# Patient Record
Sex: FEMALE | Race: WHITE | NOT HISPANIC OR LATINO | URBAN - METROPOLITAN AREA
[De-identification: names, ages, dates, MRNs, and addresses within clinical notes are randomized per-mention and may not be internally consistent; named-entity substitution may affect disease eponyms.]

---

## 2019-02-05 ENCOUNTER — EMERGENCY (EMERGENCY)
Facility: HOSPITAL | Age: 69
LOS: 1 days | Discharge: HOME | End: 2019-02-05
Admitting: EMERGENCY MEDICINE
Payer: MEDICARE

## 2019-02-05 ENCOUNTER — INBOUND DOCUMENT (OUTPATIENT)
Age: 69
End: 2019-02-05

## 2019-02-05 VITALS
TEMPERATURE: 98 F | HEART RATE: 76 BPM | RESPIRATION RATE: 18 BRPM | SYSTOLIC BLOOD PRESSURE: 123 MMHG | DIASTOLIC BLOOD PRESSURE: 87 MMHG | OXYGEN SATURATION: 98 %

## 2019-02-05 PROBLEM — Z00.00 ENCOUNTER FOR PREVENTIVE HEALTH EXAMINATION: Status: ACTIVE | Noted: 2019-02-05

## 2019-02-05 PROCEDURE — 73562 X-RAY EXAM OF KNEE 3: CPT | Mod: 26,LT

## 2019-02-05 PROCEDURE — 73610 X-RAY EXAM OF ANKLE: CPT | Mod: 26,LT

## 2019-02-05 PROCEDURE — 99283 EMERGENCY DEPT VISIT LOW MDM: CPT | Mod: 25

## 2019-02-05 RX ORDER — IBUPROFEN 200 MG
600 TABLET ORAL ONCE
Qty: 0 | Refills: 0 | Status: COMPLETED | OUTPATIENT
Start: 2019-02-05 | End: 2019-02-05

## 2019-02-05 RX ADMIN — Medication 600 MILLIGRAM(S): at 11:57

## 2019-02-05 NOTE — ED PROVIDER NOTE - OBJECTIVE STATEMENT
69 y/o female with no pertinent PMHx presents to the ED with complaints of left knee and ankle pain [place]. Pt states when she slipped on the stairs, she twisted her ankle and knee. She is able to bear weight on left leg. Denies any fall. No head trauma, no deformities, no abrasions.

## 2019-02-05 NOTE — ED PROVIDER NOTE - PHYSICAL EXAMINATION
VITAL SIGNS: I have reviewed nursing notes and confirm.  CONSTITUTIONAL: Well-developed; well-nourished; in no acute distress.  SKIN: Skin is warm and dry, no acute rash.  HEAD: Normocephalic; atraumatic.  EYES: PERRL, EOM intact; conjunctiva and sclera clear.  ENT: No nasal discharge; airway clear.  NECK: Supple; non tender.  CARD: S1, S2 normal; no murmurs, gallops, or rubs. Regular rate and rhythm.  RESP: No wheezes, rales or rhonchi.  ABD: Normal bowel sounds; soft; non-distended; non-tender; no hepatosplenomegaly.  EXT: Left lower ankle; +Ecchymosis and edema to lateral malleolus. Mild tenderness to palpation. No bony instability, no bony point tenderness. FROM of ankle, 5/5 strength with pain. Weight bearing with limp. 2+ distal pulses. Sensation intact. <2 second cap refill.    NEURO: Alert, oriented. Grossly unremarkable.  PSYCH: Cooperative, appropriate.

## 2019-02-05 NOTE — ED ADULT NURSE NOTE - NSIMPLEMENTINTERV_GEN_ALL_ED
Implemented All Universal Safety Interventions:  [employer] to call system. Call bell, personal items and telephone within reach. Instruct patient to call for assistance. Room bathroom lighting operational. Non-slip footwear when patient is off stretcher. Physically safe environment: no spills, clutter or unnecessary equipment. Stretcher in lowest position, wheels locked, appropriate side rails in place.

## 2019-02-05 NOTE — ED PROVIDER NOTE - DIAGNOSTIC INTERPRETATION
Radiology Interpretation performed by ED JOSE Bacon   Left knee and ankle x-ray, 3 views  No fracture, no dislocation (joint spaces grossly normal), no Foreign Body noted, soft tissue normal

## 2019-02-05 NOTE — ED PROVIDER NOTE - NSFOLLOWUPINSTRUCTIONS_ED_ALL_ED_FT
-PLEASE FOLLOW-UP WITH YOUR PRIMARY CARE DOCTOR IN 1-2 DAYS.  BRING ALL PAPERWORK FROM TODAY'S VISIT TO YOUR FOLLOW-UP VISIT.  IF YOU DO NOT HAVE A PRIMARY CARE DOCTOR PLEASE REFER TO THE OFFICE/CLINIC INFORMATION GIVEN ABOVE.  YOU MAY ALSO CALL [phone] AND ASK FOR MS. CLEM VANESSA.  SHE CAN HELP YOU MAKE A FOLLOW-UP APPOINTMENT.  HER HOURS ARE 11AM-7PM MONDAY - FRIDAY.  -TAKE OVER THE COUNTER TYLENOL 650MG BY MOUTH EVERY 4-6 HOURS AS NEEDED FOR PAIN.  DO NOT MIX WITH ALCOHOL OR OTHER PRESCRIPTION MEDICATIONS THAT ALREADY CONTAIN TYLENOL OR ACETAMINOPHEN.   -TAKE OVER THE COUNTER IBUPROFEN 400-600MG BY MOUTH EVERY 8 HOURS AS NEEDED FOR PAIN.  BE SURE TO TAKE WITH FOOD OR MILK AS THIS MEDICATION CAN CAUSE STOMACH IRRITATION.  -PLEASE RETURN TO THE ER IMMEDIATELY OR CALL 911 FOR ANY HIGH FEVER, TROUBLE BREATHING, VOMITING, SEVERE PAIN, OR ANY OTHER CONCERNS.     Sprain    A sprain is a stretch or tear in one of the tough, fiber-like tissues (ligaments) in your body. This is caused by an injury to the area such as a twisting mechanism. Symptoms include pain, swelling, or bruising. Rest that area over the next several days and slowly resume activity when tolerated. Ice can help with swelling and pain.     SEEK IMMEDIATE MEDICAL CARE IF YOU HAVE ANY OF THE FOLLOWING SYMPTOMS: worsening pain, inability to move that body part, numbness or tingling.

## 2019-02-05 NOTE — ED PROVIDER NOTE - PROGRESS NOTE DETAILS
no fractures seen on xray. Will ace bandage and give cane for +ankle sprain. Will give ortho f/u A medical screening exam was performed and no emergency medical condition was identified. I have discussed the discharge plan with the patient. The patient agrees with the plan, as discussed. The patient understands that if the symptoms worsen or if prescribed medications do not have the desired/planned effect that the patient may return to the Emergency Department at any time for further evaluation and treatment.

## 2019-02-05 NOTE — ED PROVIDER NOTE - NS ED ROS FT
Other than symptoms associated with present events the following is reported:  General:  No fever, no chills, no weight loss.  HEENT:  No sore throat.  Respiratory: No cough, no dyspnea, no wheeze.  Cardiovascular:  No chest pain, no palpitations, no orthopnea.  GI: No abdominal pain, no nausea/vomiting, no diarrhea.  : No dysuria, no frequency, no urgency.  Musculoskeletal:  No myalgia, deformities.   Endocrine:  No generalized weakness, no polyuria.  Neurological:  No headache, no focal weakness, no head trauma.   Psychiatric: No emotional stress, no depression.  Derm:  No rash, no abrasions.   Heme:  No bruising, no bleeding.

## 2019-02-09 DIAGNOSIS — M25.562 PAIN IN LEFT KNEE: ICD-10-CM
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
## 2019-02-09 DIAGNOSIS — S93.402A SPRAIN OF UNSPECIFIED LIGAMENT OF LEFT ANKLE, INITIAL ENCOUNTER: ICD-10-CM
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
## 2019-02-09 DIAGNOSIS — S90.02XA CONTUSION OF LEFT ANKLE, INITIAL ENCOUNTER: ICD-10-CM
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
## 2021-03-26 ENCOUNTER — APPOINTMENT (OUTPATIENT)
Dept: RHEUMATOLOGY | Facility: CLINIC | Age: 71
End: 2021-03-26

## 2021-06-22 ENCOUNTER — APPOINTMENT (OUTPATIENT)
Dept: RHEUMATOLOGY | Facility: CLINIC | Age: 71
End: 2021-06-22
Payer: MEDICARE

## 2021-06-22 VITALS
BODY MASS INDEX: 23.46 KG/M2 | DIASTOLIC BLOOD PRESSURE: 53 MMHG | HEIGHT: 66 IN | WEIGHT: 146 LBS | SYSTOLIC BLOOD PRESSURE: 93 MMHG | HEART RATE: 71 BPM | OXYGEN SATURATION: 96 % | TEMPERATURE: 97.6 F

## 2021-06-22 DIAGNOSIS — M25.519 PAIN IN UNSPECIFIED SHOULDER: ICD-10-CM

## 2021-06-22 PROCEDURE — 99203 OFFICE O/P NEW LOW 30 MIN: CPT

## 2021-06-28 ENCOUNTER — TRANSCRIPTION ENCOUNTER (OUTPATIENT)
Age: 71
End: 2021-06-28

## 2021-06-29 ENCOUNTER — OUTPATIENT (OUTPATIENT)
Dept: OUTPATIENT SERVICES | Facility: HOSPITAL | Age: 71
LOS: 1 days | Discharge: HOME | End: 2021-06-29

## 2021-06-29 PROBLEM — M25.519 SHOULDER PAIN: Status: ACTIVE | Noted: 2021-06-22

## 2021-06-29 NOTE — HISTORY OF PRESENT ILLNESS
[FreeTextEntry1] : 70 year old woman with history of osteoporosis on recent bone density\par Patient referred by Dr. Dyer\par \par Patient reports history of osteoporosis by DEXA\par Previous osteopenia \par \par No history of fracture\par No family history of fracture\par Mother may have had osteoporosis\par \par Meds \par simvastatin x 2 qweeks\par Tries to take vitamin D and B complex\par \par Taking vitamin D for years\par Menopause at 49 years of age\par No history of HRT use\par When very young, took OCP for menses\par \par Exercise all the time, mile hike , running up 84 steps\par Elliptical three times per week in [place] 30 minutes\par Pilates 2-3 times per week\par In [place] completed by video once per week\par Nearly a vegan, milk in tea, oatmilk in cereal, no cheese at present, trying to cut down on sugar\par No smoking, quit completely one year ago\par 2 drinks of wine per day\par \par No prednisone in past\par Will start weight training\par right shoulder with arthritis, will start PT on Wednesday\par MRI lumbar spine and hip\par Rarely takes medication for pain\par \par No history of thyroid problems\par May have had a nephrolithiasis by ultrasound

## 2021-06-29 NOTE — DATA REVIEWED
[FreeTextEntry1] : MRI hip  6/16/2021\par Reviewed:\par Right hip moderate trochanteric bursitis\par mild glut min tendinosis\par Tear of anterior superior and direct superior labrum with intralabral cyst formation\par Moderate joint arthrosis, mild capsular thickening\par Mild to moderate bilateral adductor longus tendinosis\par MRI lumbar spine:\par mild to moderate lumbar levoscoliosis and thoracolumbar dextroscoliosis\par Right renal peripelvic cyst\par multilevel disc and facet joint disease without change except L2-L3 where there is mild worsening. \par \par June 23, 2021\par Labs:\par CBC normal \par cholesterol 170\par \par DEXA:\par 2/19/2021\par L spine: L4 -1.6\par Left neck -2.1, wards left -3.1, total left hip -1.6\par Right neck -2.3, wards right -3.1, total right hip -2.1

## 2021-06-29 NOTE — ASSESSMENT
[FreeTextEntry1] : 70 year old woman referred by Dr. Dyer for evaluation of osteoporosis.  Patient with previous bone density with osteopenia, now recent bone density with osteoporosis as per patient, although results not available to me at this time.  Discussed treatment options with patient, including both denosumab vs oral bisphosphonate.  Will review lab results in addition to bone density results, will try ot obtain from PMD office.  Will further discuss with patient following review of records.  Weight bearing exercises discussed in addition to increasing calcium enriched foods and continuing vitamin D supplementation.  Patient will start PT for the hip and lumbar spine pain as well, has followup with sports medicine pending.

## 2021-06-29 NOTE — PHYSICAL EXAM
[General Appearance - Alert] : alert [General Appearance - In No Acute Distress] : in no acute distress [General Appearance - Well-Appearing] : healthy appearing [Respiration, Rhythm And Depth] : normal respiratory rhythm and effort [Exaggerated Use Of Accessory Muscles For Inspiration] : no accessory muscle use [Edema] : there was no peripheral edema [No Spinal Tenderness] : no spinal tenderness [] : no rash [Oriented To Time, Place, And Person] : oriented to person, place, and time [Impaired Insight] : insight and judgment were intact [Affect] : the affect was normal

## 2021-12-09 ENCOUNTER — APPOINTMENT (OUTPATIENT)
Dept: RHEUMATOLOGY | Facility: CLINIC | Age: 71
End: 2021-12-09
Payer: MEDICARE

## 2021-12-09 VITALS
HEIGHT: 66 IN | DIASTOLIC BLOOD PRESSURE: 77 MMHG | WEIGHT: 147 LBS | TEMPERATURE: 97.4 F | OXYGEN SATURATION: 96 % | BODY MASS INDEX: 23.63 KG/M2 | SYSTOLIC BLOOD PRESSURE: 127 MMHG | HEART RATE: 70 BPM

## 2021-12-09 VITALS
WEIGHT: 147 LBS | HEIGHT: 66 IN | DIASTOLIC BLOOD PRESSURE: 77 MMHG | TEMPERATURE: 97.4 F | OXYGEN SATURATION: 96 % | BODY MASS INDEX: 23.63 KG/M2 | HEART RATE: 70 BPM | SYSTOLIC BLOOD PRESSURE: 127 MMHG

## 2021-12-09 DIAGNOSIS — M25.559 PAIN IN UNSPECIFIED HIP: ICD-10-CM

## 2021-12-09 DIAGNOSIS — Z72.89 OTHER PROBLEMS RELATED TO LIFESTYLE: ICD-10-CM

## 2021-12-09 DIAGNOSIS — Z87.891 PERSONAL HISTORY OF NICOTINE DEPENDENCE: ICD-10-CM

## 2021-12-09 PROCEDURE — 36415 COLL VENOUS BLD VENIPUNCTURE: CPT

## 2021-12-09 PROCEDURE — 99213 OFFICE O/P EST LOW 20 MIN: CPT | Mod: 25

## 2021-12-09 RX ORDER — ROSUVASTATIN CALCIUM 5 MG/1
5 TABLET, FILM COATED ORAL
Refills: 0 | Status: ACTIVE | COMMUNITY

## 2021-12-09 NOTE — HISTORY OF PRESENT ILLNESS
[FreeTextEntry1] : 71 year old woman with history of osteoporosis on recent bone density\par \par June 22, 2021\par Patient referred by Dr. Dyer\par \par Patient reports history of osteoporosis by DEXA\par Previous osteopenia \par \par No history of fracture\par No family history of fracture\par Mother may have had osteoporosis\par \par Meds \par simvastatin x 2 qweeks\par Tries to take vitamin D and B complex\par \par Taking vitamin D for years\par Menopause at 49 years of age\par No history of HRT use\par When very young, took OCP for menses\par \par Exercise all the time, mile hike , running up 84 steps\par Elliptical three times per week in [place] 30 minutes\par Pilates 2-3 times per week\par In [place] completed by video once per week\par Nearly a vegan, milk in tea, oatmilk in cereal, no cheese at present, trying to cut down on sugar\par No smoking, quit completely one year ago\par 2 drinks of wine per day\par \par No prednisone in past\par Will start weight training\par right shoulder with arthritis, will start PT on Wednesday\par MRI lumbar spine and hip\par Rarely takes medication for pain\par \par No history of thyroid problems\par May have had a nephrolithiasis by ultrasound\par \par December 9, 2021\par Had kidney stone, 12/6 had lithotripsy\par Drinking lots of water and rest\par hip or back, had injection into the right hip but did not help too much\par Does not take supplemental calcium, takes vitamin C, B, D\par Hope it is not the back, feels pain down the right leg, with walking\par Has an appointment with orthopedist at [facility] pending \par rosuvastatin 5 every other day\par No history of fracture\par concerned about hip pain and difficulties walking without limp.

## 2021-12-09 NOTE — ASSESSMENT
[FreeTextEntry1] : 71 year old woman referred by Dr. Dyer for evaluation of osteoporosis.  Patient with previous bone density with osteopenia, now recent bone density with osteoporosis of the galicia's neck, osteopenia of the total hip.  Patient would like to start alendronate at this time, reviewed risks and benefits, no jaw pain or dental work planned at this time. Reviewed side effects of alendronate with patient, including but not limited to, esophagitis, atypical fractures, osteonecrosis of the jaw, hypocalcemia, and bone pain.  Discussed method of administration of alendronate with patient as well.  Weight bearing exercises discussed but limited by right hip pain, awaiting second opinion.  Previous steroid injection to the right hip without benefit in terms of pain. Will also follow up with urologist regarding lithotripsy, will check CMP, PTH, magnesium, and phosphorus today, will have stent removed in January 2022.

## 2021-12-09 NOTE — REVIEW OF SYSTEMS
[Feeling Tired] : feeling tired [Negative] : Heme/Lymph [FreeTextEntry2] : recent lithotripsy [FreeTextEntry9] : right lower back. hip

## 2021-12-09 NOTE — PHYSICAL EXAM
[General Appearance - Alert] : alert [General Appearance - In No Acute Distress] : in no acute distress [General Appearance - Well-Appearing] : healthy appearing [Sclera] : the sclera and conjunctiva were normal [] : no respiratory distress [Respiration, Rhythm And Depth] : normal respiratory rhythm and effort [Exaggerated Use Of Accessory Muscles For Inspiration] : no accessory muscle use [No Spinal Tenderness] : no spinal tenderness [Oriented To Time, Place, And Person] : oriented to person, place, and time [Impaired Insight] : insight and judgment were intact [Affect] : the affect was normal [FreeTextEntry1] : antalgic gait secondary to right hip

## 2021-12-10 LAB
25(OH)D3 SERPL-MCNC: 46 NG/ML
ALBUMIN SERPL ELPH-MCNC: 4.4 G/DL
ALP BLD-CCNC: 83 U/L
ALT SERPL-CCNC: 19 U/L
ANION GAP SERPL CALC-SCNC: 11 MMOL/L
AST SERPL-CCNC: 23 U/L
BILIRUB SERPL-MCNC: 0.4 MG/DL
BUN SERPL-MCNC: 15 MG/DL
CALCIUM SERPL-MCNC: 9.4 MG/DL
CALCIUM SERPL-MCNC: 9.4 MG/DL
CHLORIDE SERPL-SCNC: 101 MMOL/L
CO2 SERPL-SCNC: 24 MMOL/L
CREAT SERPL-MCNC: 0.79 MG/DL
GLUCOSE SERPL-MCNC: 84 MG/DL
MAGNESIUM SERPL-MCNC: 2.1 MG/DL
PARATHYROID HORMONE INTACT: 34 PG/ML
PHOSPHATE SERPL-MCNC: 4.4 MG/DL
POTASSIUM SERPL-SCNC: 5.1 MMOL/L
PROT SERPL-MCNC: 6.8 G/DL
SODIUM SERPL-SCNC: 136 MMOL/L

## 2021-12-25 NOTE — ED PROVIDER NOTE - CARE PROVIDER_API CALL
Dr. Edwards MD ed Medicine Will MOBLEY Dr KRISTAN Edwards Dr. Hernández Kuldeep Mak)  Orthopaedic Surgery Surgery  [address] [place] [address]  Phone: [phone]  Fax: [phone]  Follow Up Time:

## 2022-01-05 NOTE — ED ADULT NURSE NOTE - PRO INTERPRETER NEED 2
Female Pregnancy Counseling Text: Female patients should also be on two forms of birth control while taking this medication and for one month after their last dose. English Lower Range (In Mg/Kg): 120 Xerosis Aggressive Treatment: I recommended application of Cetaphil or CeraVe numerous times a day going to bed to all dry areas. I also prescribed a topical steroid for twice daily use. Dosing Month 2 (Required For Cumulative Dosing): 40mg BID Calculate Months Of Therapy Based On Documented Dosages (Will Hide Months Of Therapy Question)?: No Headache Monitoring: I recommended monitoring the headaches for now. There is no evidence of increased intracranial pressure. They were instructed to call if the headaches are worsening. Are Labs Available For Review?: No- Pending Cheilitis Aggressive Treatment: I recommended application of Vaseline or Aquaphor numerous times a day (as often as every hour) and before going to bed. I also prescribed a topical steroid for twice daily use. Nosebleeds Normal Treatment: I explained this is common when taking isotretinoin. I recommended saline mist in each nostril multiple times a day. If this worsens they will contact us. Xerosis Normal Treatment: I recommended application of Cetaphil or CeraVe numerous times a day and before going to bed to all dry areas. Use Therapeutic Ranged Or Therapeutic Target: please select Range or Target Detail Level: Zone Any Nosebleeds?: Yes - Normal Treatment Myalgia Monitoring: I explained this is common when taking isotretinoin. If this worsens they will contact us. Show Text Field For Brand Names Of Contraception?: Yes Myalgia Treatment: I explained this is common when taking isotretinoin. If this worsens they will contact us. They may try OTC ibuprofen. Retinoid Dermatitis Normal Treatment: I recommended more frequent application of Cetaphil or CeraVe to the areas of dermatitis. Xerosis Aggressive Treatment: I recommended application of Cetaphil or CeraVe numerous times a day and before going to bed to all dry areas. I also prescribed a topical steroid for twice daily use. Hypercholesterolemia Monitoring: I explained this is common when taking isotretinoin. We will monitor closely. Xerosis Normal Treatment: I recommended application of Cetaphil or CeraVe numerous times a day going to bed to all dry areas. Pounds Preamble Statement (Weight Entered In Details Tab): Reported Weight in pounds: Retinoid Dermatitis Aggressive Treatment: I recommended more frequent application of Cetaphil or CeraVe to the areas of dermatitis. I also prescribed a topical steroid for twice daily use until the dermatitis resolves. Male Completion Statement: After discussing his treatment course we decided to discontinue isotretinoin therapy at this time. He shouldn't donate blood for one month after the last dose. He should call with any new symptoms of depression. Patient Weight (Optional But Required For Cumulative Dose-Numbers And Decimals Only): 75 Cheilitis Normal Treatment: I recommended application of Vaseline or Aquaphor numerous times a day (as often as every hour) and before going to bed. Target Cumulative Dosage (In Mg/Kg): 135 Months Of Therapy Completed: 6 Upper Range (In Mg/Kg): 150 What Is The Patient's Gender: Female Next Month's Dosage: Continue Current Dosage Weight Units: kilograms Female Completion Statement: After discussing her treatment course we decided to discontinue isotretinoin therapy at this time. I explained that she would need to continue her birth control methods for at least one month after the last dosage. She should also get a pregnancy test one month after the last dose. She shouldn't donate blood for one month after the last dose. She should call with any new symptoms of depression. Ipledge Number (Optional): [id number] Counseling Text: I reviewed the side effect in detail. Patient should get monthly blood tests, not donate blood, not drive at night if vision affected, and not share medication. Kilograms Preamble Statement (Weight Entered In Details Tab): Reported Weight in kilograms:

## 2022-09-16 ENCOUNTER — APPOINTMENT (OUTPATIENT)
Dept: OPHTHALMOLOGY | Facility: CLINIC | Age: 72
End: 2022-09-16

## 2022-09-16 ENCOUNTER — NON-APPOINTMENT (OUTPATIENT)
Age: 72
End: 2022-09-16

## 2022-09-16 PROCEDURE — 92250 FUNDUS PHOTOGRAPHY W/I&R: CPT

## 2022-09-16 PROCEDURE — 92286 ANT SGM IMG I&R SPECLR MIC: CPT

## 2022-09-16 PROCEDURE — 92025 CPTRIZED CORNEAL TOPOGRAPHY: CPT

## 2022-09-16 PROCEDURE — 92004 COMPRE OPH EXAM NEW PT 1/>: CPT

## 2022-10-17 ENCOUNTER — APPOINTMENT (OUTPATIENT)
Dept: RADIOLOGY | Facility: CLINIC | Age: 72
End: 2022-10-17

## 2022-10-17 PROCEDURE — 71046 X-RAY EXAM CHEST 2 VIEWS: CPT

## 2022-10-21 ENCOUNTER — APPOINTMENT (OUTPATIENT)
Dept: OPHTHALMOLOGY | Facility: CLINIC | Age: 72
End: 2022-10-21

## 2022-10-21 ENCOUNTER — NON-APPOINTMENT (OUTPATIENT)
Age: 72
End: 2022-10-21

## 2022-10-21 PROCEDURE — 92012 INTRM OPH EXAM EST PATIENT: CPT

## 2023-01-19 ENCOUNTER — APPOINTMENT (OUTPATIENT)
Dept: RHEUMATOLOGY | Facility: CLINIC | Age: 73
End: 2023-01-19
Payer: MEDICARE

## 2023-01-19 VITALS
HEART RATE: 78 BPM | DIASTOLIC BLOOD PRESSURE: 64 MMHG | WEIGHT: 147 LBS | SYSTOLIC BLOOD PRESSURE: 121 MMHG | TEMPERATURE: 97.6 F | HEIGHT: 66 IN | BODY MASS INDEX: 23.63 KG/M2 | OXYGEN SATURATION: 96 %

## 2023-01-19 DIAGNOSIS — N20.0 CALCULUS OF KIDNEY: ICD-10-CM

## 2023-01-19 PROCEDURE — 99214 OFFICE O/P EST MOD 30 MIN: CPT | Mod: 25

## 2023-01-19 PROCEDURE — 36415 COLL VENOUS BLD VENIPUNCTURE: CPT

## 2023-01-20 PROBLEM — N20.0 KIDNEY CALCULI: Status: ACTIVE | Noted: 2021-12-09

## 2023-01-20 NOTE — PHYSICAL EXAM
[General Appearance - Alert] : alert [General Appearance - In No Acute Distress] : in no acute distress [General Appearance - Well Nourished] : well nourished [General Appearance - Well Developed] : well developed [General Appearance - Well-Appearing] : healthy appearing [Sclera] : the sclera and conjunctiva were normal [Examination Of The Oral Cavity] : the lips and gums were normal [Oropharynx] : the oropharynx was normal [] : no respiratory distress [Respiration, Rhythm And Depth] : normal respiratory rhythm and effort [Exaggerated Use Of Accessory Muscles For Inspiration] : no accessory muscle use [Edema] : there was no peripheral edema [No Spinal Tenderness] : no spinal tenderness [Abnormal Walk] : normal gait [Nail Clubbing] : no clubbing  or cyanosis of the fingernails [Musculoskeletal - Swelling] : no joint swelling seen [Oriented To Time, Place, And Person] : oriented to person, place, and time [Impaired Insight] : insight and judgment were intact [Affect] : the affect was normal [Mood] : the mood was normal [FreeTextEntry1] : No active synovitis of the upper and lower extremities bilaterally.

## 2023-01-20 NOTE — HISTORY OF PRESENT ILLNESS
[FreeTextEntry1] : Patient returns for follow up of osteoporosis\par \par June 22, 2021\par Patient referred by Dr. Dyer\par \par Patient reports history of osteoporosis by DEXA\par Previous osteopenia \par \par No history of fracture\par No family history of fracture\par Mother may have had osteoporosis\par \par Meds \par simvastatin x 2 qweeks\par Tries to take vitamin D and B complex\par \par Taking vitamin D for years\par Menopause at 49 years of age\par No history of HRT use\par When very young, took OCP for menses\par \par Exercise all the time, mile hike , running up 84 steps\par Elliptical three times per week in [place] 30 minutes\par Pilates 2-3 times per week\par In [place] completed by video once per week\par Nearly a vegan, milk in tea, oatmilk in cereal, no cheese at present, trying to cut down on sugar\par No smoking, quit completely one year ago\par 2 drinks of wine per day\par \par No prednisone in past\par Will start weight training\par right shoulder with arthritis, will start PT on Wednesday\par MRI lumbar spine and hip\par Rarely takes medication for pain\par \par No history of thyroid problems\par May have had a nephrolithiasis by ultrasound\par \par December 9, 2021\par Had kidney stone, 12/6 had lithotripsy\par Drinking lots of water and rest\par hip or back, had injection into the right hip but did not help too much\par Does not take supplemental calcium, takes vitamin C, B, D\par Hope it is not the back, feels pain down the right leg, with walking\par Has an appointment with orthopedist at [facility] pending \par rosuvastatin 5 every other day\par No history of fracture\par concerned about hip pain and difficulties walking without limp.\par \par January 19, 2023\par Patient returns for follow up\par Went to [place], was unable to take alendronate regularly \par Also diagnosed with kidney stones\par Some sciatica as well, much improved with home exercises\par exercise, 30 bridges 100 core, pilates, toe taps, 30 toe taps 30 bicycle kicks\par arm exercise leg bands\par 40 dips with weights\par Goes to the gym if in [place]\par In [place], usually feels better (daughter lives in [place])\par Balance exercises 40 seconds\par No recent fractures\par No extraction or implants\par No jaw pain\par No other medications\par For nephrolithiasis, take Vinegar water, will see urologist in March \par Taking vitamin a little bit of D with K\par No recent blood tests\par No side effects of \par \par

## 2023-01-20 NOTE — ASSESSMENT
[FreeTextEntry1] : 72 year old woman returns for follow up of osteoporosis.  Patient with history of osteoporosis without fracture, currently treated with alendronate 70 mg qweek, doing well.  Patient started alendronate 12/2021 with some lapse in treatment secondary to recent travel, but will restart.  Discussed change to zoledronic acid, but patient would like to continue oral medications. Reviewed risks and benefits of alendronate, no jaw pain or dental work planned at this time, scheduled for regular cleaning next week.  Reviewed side effects of alendronate with patient, including but not limited to, esophagitis, atypical fractures, osteonecrosis of the jaw, hypocalcemia, and bone pain.  Discussed method of administration of alendronate with patient as well.  Patient will continue home exercise routine. Will follow up with urologist regarding nephrolithiasis and recommendations for vitamin supplementation given recent kidney stone. Will update labs today in office. Order placed for repeat bone density as well.

## 2023-01-21 LAB
25(OH)D3 SERPL-MCNC: 39.6 NG/ML
ALBUMIN SERPL ELPH-MCNC: 4.3 G/DL
ALP BLD-CCNC: 146 U/L
ALT SERPL-CCNC: 22 U/L
ANION GAP SERPL CALC-SCNC: 12 MMOL/L
AST SERPL-CCNC: 20 U/L
BILIRUB SERPL-MCNC: 0.3 MG/DL
BUN SERPL-MCNC: 15 MG/DL
CALCIUM SERPL-MCNC: 10 MG/DL
CALCIUM SERPL-MCNC: 10 MG/DL
CHLORIDE SERPL-SCNC: 104 MMOL/L
CO2 SERPL-SCNC: 23 MMOL/L
CREAT SERPL-MCNC: 0.71 MG/DL
EGFR: 90 ML/MIN/1.73M2
GLUCOSE SERPL-MCNC: 102 MG/DL
MAGNESIUM SERPL-MCNC: 2 MG/DL
PARATHYROID HORMONE INTACT: 28 PG/ML
PHOSPHATE SERPL-MCNC: 3.7 MG/DL
POTASSIUM SERPL-SCNC: 4.6 MMOL/L
PROT SERPL-MCNC: 6.6 G/DL
SODIUM SERPL-SCNC: 139 MMOL/L

## 2023-01-25 ENCOUNTER — APPOINTMENT (OUTPATIENT)
Dept: OPHTHALMOLOGY | Facility: CLINIC | Age: 73
End: 2023-01-25
Payer: MEDICARE

## 2023-01-25 ENCOUNTER — NON-APPOINTMENT (OUTPATIENT)
Age: 73
End: 2023-01-25

## 2023-01-25 ENCOUNTER — TRANSCRIPTION ENCOUNTER (OUTPATIENT)
Age: 73
End: 2023-01-25

## 2023-01-25 PROCEDURE — 92012 INTRM OPH EXAM EST PATIENT: CPT

## 2023-03-27 ENCOUNTER — APPOINTMENT (OUTPATIENT)
Dept: OPHTHALMOLOGY | Facility: AMBULATORY SURGERY CENTER | Age: 73
End: 2023-03-27

## 2023-03-28 ENCOUNTER — APPOINTMENT (OUTPATIENT)
Dept: OPHTHALMOLOGY | Facility: CLINIC | Age: 73
End: 2023-03-28

## 2023-04-04 ENCOUNTER — APPOINTMENT (OUTPATIENT)
Dept: OPHTHALMOLOGY | Facility: CLINIC | Age: 73
End: 2023-04-04

## 2023-04-24 ENCOUNTER — APPOINTMENT (OUTPATIENT)
Dept: OPHTHALMOLOGY | Facility: AMBULATORY SURGERY CENTER | Age: 73
End: 2023-04-24

## 2024-01-22 ENCOUNTER — APPOINTMENT (OUTPATIENT)
Dept: RHEUMATOLOGY | Facility: CLINIC | Age: 74
End: 2024-01-22
Payer: MEDICARE

## 2024-01-22 VITALS
WEIGHT: 145 LBS | HEIGHT: 66 IN | TEMPERATURE: 97.6 F | DIASTOLIC BLOOD PRESSURE: 68 MMHG | OXYGEN SATURATION: 99 % | HEART RATE: 66 BPM | BODY MASS INDEX: 23.3 KG/M2 | SYSTOLIC BLOOD PRESSURE: 110 MMHG

## 2024-01-22 DIAGNOSIS — M81.0 AGE-RELATED OSTEOPOROSIS W/OUT CURRENT PATHOLOGICAL FRACTURE: ICD-10-CM

## 2024-01-22 PROCEDURE — 99213 OFFICE O/P EST LOW 20 MIN: CPT | Mod: 25

## 2024-01-22 PROCEDURE — 36415 COLL VENOUS BLD VENIPUNCTURE: CPT

## 2024-01-22 RX ORDER — ALENDRONATE SODIUM 70 MG/1
70 TABLET ORAL
Qty: 13 | Refills: 0 | Status: ACTIVE | COMMUNITY
Start: 2021-12-09 | End: 1900-01-01

## 2024-01-22 NOTE — END OF VISIT
[FreeTextEntry3] : All medical record entries made by the Scribe were at my, Dr. Mary Camacho MD, direction and personally dictated by me on 01/22/2024. I have reviewed the chart and agree that the record accurately reflects my personal performance of the history, physical exam, assessment and plan. I have also personally directed, reviewed, and agreed with the chart. [Time Spent: ___ minutes] : I have spent [unfilled] minutes of time on the encounter.

## 2024-01-22 NOTE — ADDENDUM
[FreeTextEntry1] : I, Aleks Hector, documented this note as a scribe on behalf of Dr. Mary Camacho MD on 01/22/2024.

## 2024-01-22 NOTE — PHYSICAL EXAM
[General Appearance - Alert] : alert [General Appearance - In No Acute Distress] : in no acute distress [General Appearance - Well Nourished] : well nourished [General Appearance - Well Developed] : well developed [General Appearance - Well-Appearing] : healthy appearing [Sclera] : the sclera and conjunctiva were normal [Respiration, Rhythm And Depth] : normal respiratory rhythm and effort [Exaggerated Use Of Accessory Muscles For Inspiration] : no accessory muscle use [Edema] : there was no peripheral edema [Abnormal Walk] : normal gait [Nail Clubbing] : no clubbing  or cyanosis of the fingernails [Musculoskeletal - Swelling] : no joint swelling seen [] : no rash [Oriented To Time, Place, And Person] : oriented to person, place, and time [Impaired Insight] : insight and judgment were intact [Affect] : the affect was normal [Mood] : the mood was normal [FreeTextEntry1] : No active synovitis of the upper and lower extremities bilaterally.

## 2024-01-22 NOTE — HISTORY OF PRESENT ILLNESS
[FreeTextEntry1] : Patient returns for follow up of osteoporosis  June 22, 2021 Patient referred by Dr. Dyer  Patient reports history of osteoporosis by DEXA Previous osteopenia   No history of fracture No family history of fracture Mother may have had osteoporosis  Meds  simvastatin x 2 qweeks Tries to take vitamin D and B complex  Taking vitamin D for years Menopause at 49 years of age No history of HRT use When very young, took OCP for menses  Exercise all the time, mile hike , running up 84 steps Elliptical three times per week in [place] 30 minutes Pilates 2-3 times per week In [facility] completed by video once per week Nearly a vegan, milk in tea, oatmilk in cereal, no cheese at present, trying to cut down on sugar No smoking, quit completely one year ago 2 drinks of wine per day  No prednisone in past Will start weight training right shoulder with arthritis, will start PT on Wednesday MRI lumbar spine and hip Rarely takes medication for pain  No history of thyroid problems May have had a nephrolithiasis by ultrasound  December 9, 2021 Had kidney stone, 12/6 had lithotripsy Drinking lots of water and rest hip or back, had injection into the right hip but did not help too much Does not take supplemental calcium, takes vitamin C, B, D Hope it is not the back, feels pain down the right leg, with walking Has an appointment with orthopedist at [facility] pending  rosuvastatin 5 every other day No history of fracture concerned about hip pain and difficulties walking without limp.  January 19, 2023 Patient returns for follow up Went to [place], was unable to take alendronate regularly  Also diagnosed with kidney stones Some sciatica as well, much improved with home exercises exercise, 30 bridges 100 core, pilates, toe taps, 30 toe taps 30 bicycle kicks arm exercise leg bands 40 dips with weights Goes to the gym if in [place] In [place], usually feels better (daughter lives in [place]) Balance exercises 40 seconds No recent fractures No extraction or implants No jaw pain No other medications For nephrolithiasis, take Vinegar water, will see urologist in March  Taking vitamin a little bit of D with K No recent blood tests No side effects of   January 22, 2024 Patient returns for follow up of osteoporosis Patient is feeling generally well No recent fractures, though reports some falls Patient continues Alendronate 70 mg qweek, reports nausea lasting one hour after taking Taking stone stopper supplement as patient with nephrolithiasis in the past Patient is currently taking calcium and rosuvastatin 5 mg 3x a week Received injections in shoulders with benefit Repeat bone density due Exercises by doing Pilates twice per week and walking 3 times a week 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

## 2024-01-22 NOTE — ASSESSMENT
[FreeTextEntry1] : 73 year old woman returns for follow up of osteoporosis. Patient with history of osteoporosis without fracture,  currently treated with alendronate, started 12/2021, continues 70 mg qweek, doing well although with some nauseas following dose of alendronate.  Discussed change to zoledronic acid previously, but patient would like to continue oral medications as travels often and may be difficult to schedule. Reviewed risks and benefits of alendronate, no jaw pain or dental work planned at this time, scheduled for regular cleaning next week. Reviewed side effects of alendronate with patient, including but not limited to, esophagitis, atypical fractures, osteonecrosis of the jaw, hypocalcemia, and bone pain. Discussed method of administration of alendronate with patient as well. Patient will continue exercise routine including walking and pilates classes. Will follow up with urologist regarding nephrolithiasis and recommendations for vitamin supplementation. Will update labs today in office. Order placed for repeat bone density as well. Will update labs today in office including CMP and vitamin D. Further treatment pending DEXA results.

## 2024-01-23 LAB
25(OH)D3 SERPL-MCNC: 31.3 NG/ML
ALBUMIN SERPL ELPH-MCNC: 4.4 G/DL
ALP BLD-CCNC: 46 U/L
ALT SERPL-CCNC: 15 U/L
ANION GAP SERPL CALC-SCNC: 12 MMOL/L
AST SERPL-CCNC: 26 U/L
BILIRUB SERPL-MCNC: 0.3 MG/DL
BUN SERPL-MCNC: 12 MG/DL
CALCIUM SERPL-MCNC: 9 MG/DL
CHLORIDE SERPL-SCNC: 102 MMOL/L
CO2 SERPL-SCNC: 22 MMOL/L
CREAT SERPL-MCNC: 0.76 MG/DL
EGFR: 82 ML/MIN/1.73M2
GLUCOSE SERPL-MCNC: 87 MG/DL
POTASSIUM SERPL-SCNC: 4.7 MMOL/L
PROT SERPL-MCNC: 6.6 G/DL
SODIUM SERPL-SCNC: 136 MMOL/L

## 2024-01-24 ENCOUNTER — OUTPATIENT (OUTPATIENT)
Dept: OUTPATIENT SERVICES | Facility: HOSPITAL | Age: 74
LOS: 1 days | End: 2024-01-24

## 2024-01-24 ENCOUNTER — APPOINTMENT (OUTPATIENT)
Dept: RADIOLOGY | Facility: CLINIC | Age: 74
End: 2024-01-24
Payer: MEDICARE

## 2024-01-24 PROCEDURE — 77080 DXA BONE DENSITY AXIAL: CPT | Mod: 26

## 2024-02-02 ENCOUNTER — NON-APPOINTMENT (OUTPATIENT)
Age: 74
End: 2024-02-02

## 2025-03-30 ENCOUNTER — EMERGENCY (EMERGENCY)
Facility: HOSPITAL | Age: 75
LOS: 1 days | Discharge: HOME | End: 2025-03-30
Attending: EMERGENCY MEDICINE | Admitting: EMERGENCY MEDICINE
Payer: MEDICARE

## 2025-03-30 VITALS
TEMPERATURE: 97 F | DIASTOLIC BLOOD PRESSURE: 66 MMHG | WEIGHT: 145.06 LBS | RESPIRATION RATE: 18 BRPM | HEART RATE: 71 BPM | OXYGEN SATURATION: 99 % | SYSTOLIC BLOOD PRESSURE: 117 MMHG

## 2025-03-30 DIAGNOSIS — Z96.611 PRESENCE OF RIGHT ARTIFICIAL SHOULDER JOINT: Chronic | ICD-10-CM

## 2025-03-30 PROCEDURE — 99283 EMERGENCY DEPT VISIT LOW MDM: CPT

## 2025-03-30 RX ORDER — MAGNESIUM CITRATE
296 SOLUTION, ORAL ORAL ONCE
Refills: 0 | Status: COMPLETED | OUTPATIENT
Start: 2025-03-30 | End: 2025-03-30

## 2025-03-30 RX ORDER — POLYETHYLENE GLYCOL 3350 17 G/17G
17 POWDER, FOR SOLUTION ORAL
Qty: 1 | Refills: 0
Start: 2025-03-30 | End: 2025-04-05

## 2025-03-30 RX ORDER — SALINE 7; 19 G/118ML; G/118ML
1 ENEMA RECTAL ONCE
Refills: 0 | Status: COMPLETED | OUTPATIENT
Start: 2025-03-30 | End: 2025-03-30

## 2025-03-30 RX ADMIN — SALINE 1 ENEMA: 7; 19 ENEMA RECTAL at 08:58

## 2025-03-30 RX ADMIN — Medication 296 MILLILITER(S): at 10:09
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
## 2025-03-30 NOTE — ED ADULT TRIAGE NOTE - CHIEF COMPLAINT QUOTE
Pt is 1week s/p complete shoulder replacement c/o constipation x4days. Taking dulcolax w/out improvement. Has been taking oxycodone for pain.

## 2025-03-30 NOTE — ED PROVIDER NOTE - PHYSICAL EXAMINATION
Gen: NAD. HEENT: NCAT, mmm   Chest: RRR, nl S1 and S2, no m/r/g. Resp: CTAB, no w/r/r  Abd: nl BS, soft, nt/nd, palpable stool in left lower quadrant. Ext: Warm, dry. Right arm in sling  Neuro: CN II-XII intact, normal and equal strength, sensation, and reflexes bilaterally, speech clear  Psych: AAOx3

## 2025-03-30 NOTE — ED PROVIDER NOTE - OBJECTIVE STATEMENT
Patient reports that she had right shoulder replacement 1 week ago.  Has been taking oxycodone for pain.  Also has been taking Dulcolax.  Has not had a bowel movement in 1 week.  Normally has regular bowel movements every morning.  Feels lower abdominal cramping and urge to defecate but unable to pass any stool.  Is passing gas.  No fever, chest pain, shortness of breath, nausea, vomiting, diarrhea.

## 2025-03-30 NOTE — ED PROVIDER NOTE - PROGRESS NOTE DETAILS
Patient had small amount of hard stool out after enema. Will give magnesium citrate to take at home and start patient on miralax. Return to the ED immediately if getting worse, not improving, or if having any new or troubling symptoms.

## 2025-03-30 NOTE — ED PROVIDER NOTE - PATIENT PORTAL LINK FT
You can access the FollowMyHealth Patient Portal offered by [facility] by registering at the following website: [email or web address]. By joining [facility]’s FollowMyHealth portal, you will also be able to view your health information using other applications (apps) compatible with our system.

## 2025-04-02 DIAGNOSIS — R10.30 LOWER ABDOMINAL PAIN, UNSPECIFIED: ICD-10-CM

## 2025-04-02 DIAGNOSIS — Z96.611 PRESENCE OF RIGHT ARTIFICIAL SHOULDER JOINT: ICD-10-CM

## 2025-04-02 DIAGNOSIS — K59.00 CONSTIPATION, UNSPECIFIED: ICD-10-CM
